# Patient Record
Sex: FEMALE | Race: WHITE | NOT HISPANIC OR LATINO | Employment: UNEMPLOYED | URBAN - METROPOLITAN AREA
[De-identification: names, ages, dates, MRNs, and addresses within clinical notes are randomized per-mention and may not be internally consistent; named-entity substitution may affect disease eponyms.]

---

## 2019-10-02 ENCOUNTER — OFFICE VISIT (OUTPATIENT)
Dept: CARDIOLOGY | Facility: CLINIC | Age: 59
End: 2019-10-02

## 2019-10-02 VITALS
DIASTOLIC BLOOD PRESSURE: 78 MMHG | HEART RATE: 84 BPM | HEIGHT: 64 IN | BODY MASS INDEX: 32.1 KG/M2 | WEIGHT: 188 LBS | SYSTOLIC BLOOD PRESSURE: 102 MMHG | RESPIRATION RATE: 16 BRPM | OXYGEN SATURATION: 96 %

## 2019-10-02 DIAGNOSIS — E78.2 MIXED HYPERLIPIDEMIA: ICD-10-CM

## 2019-10-02 DIAGNOSIS — I25.119 CORONARY ARTERY DISEASE WITH ANGINA PECTORIS, UNSPECIFIED VESSEL OR LESION TYPE, UNSPECIFIED WHETHER NATIVE OR TRANSPLANTED HEART (HCC): Primary | ICD-10-CM

## 2019-10-02 PROCEDURE — 99203 OFFICE O/P NEW LOW 30 MIN: CPT | Performed by: INTERNAL MEDICINE

## 2019-10-02 PROCEDURE — 93000 ELECTROCARDIOGRAM COMPLETE: CPT | Performed by: INTERNAL MEDICINE

## 2019-10-02 RX ORDER — ZOLPIDEM TARTRATE 10 MG/1
TABLET ORAL
Refills: 5 | COMMUNITY
Start: 2019-09-19

## 2019-10-02 RX ORDER — PANTOPRAZOLE SODIUM 40 MG/1
TABLET, DELAYED RELEASE ORAL
COMMUNITY
Start: 2017-03-06

## 2019-10-02 RX ORDER — CHLORCYCLIZINE HYDROCHLORIDE AND PSEUDOEPHEDRINE HYDROCHLORIDE 25; 60 MG/1; MG/1
TABLET ORAL
Refills: 1 | COMMUNITY
Start: 2019-09-10 | End: 2021-01-28 | Stop reason: HOSPADM

## 2019-10-02 RX ORDER — TOPIRAMATE 50 MG/1
TABLET, FILM COATED ORAL
Refills: 5 | COMMUNITY
Start: 2019-09-09

## 2019-10-02 RX ORDER — TRIAMTERENE AND HYDROCHLOROTHIAZIDE 75; 50 MG/1; MG/1
TABLET ORAL DAILY
Refills: 3 | COMMUNITY
Start: 2019-07-17

## 2019-10-02 RX ORDER — PHENTERMINE HYDROCHLORIDE 37.5 MG/1
TABLET ORAL
Refills: 1 | COMMUNITY
Start: 2019-09-19

## 2019-10-02 RX ORDER — ROSUVASTATIN CALCIUM 5 MG/1
10 TABLET, COATED ORAL DAILY
Refills: 4 | COMMUNITY
Start: 2019-09-12

## 2019-10-02 RX ORDER — ESCITALOPRAM OXALATE 20 MG/1
TABLET ORAL DAILY
Refills: 2 | COMMUNITY
Start: 2019-09-12

## 2019-10-02 RX ORDER — CETIRIZINE HYDROCHLORIDE 5 MG/1
5 TABLET ORAL DAILY
COMMUNITY

## 2019-10-02 RX ORDER — POLYETHYLENE GLYCOL 3350 17 G/17G
17 POWDER, FOR SOLUTION ORAL DAILY
COMMUNITY

## 2019-10-02 RX ORDER — BUPROPION HYDROCHLORIDE 150 MG/1
TABLET ORAL
COMMUNITY
Start: 2017-03-15

## 2019-10-02 RX ORDER — ASPIRIN 81 MG/1
81 TABLET ORAL DAILY
COMMUNITY

## 2019-10-02 RX ORDER — MULTIPLE VITAMINS W/ MINERALS TAB 9MG-400MCG
1 TAB ORAL DAILY
COMMUNITY

## 2019-10-02 RX ORDER — MELOXICAM 15 MG/1
TABLET ORAL
Refills: 3 | COMMUNITY
Start: 2019-07-18

## 2019-10-02 NOTE — PROGRESS NOTES
[facility]      Patient Name: Zahira Springer  :1960  Age: 59 y.o.  Encounter Provider:  Jorje Grigsby Jr, MD      Chief Complaint:   Chief Complaint   Patient presents with   • Coronary Artery Disease         HPI  Zahira Springer is a 59 y.o. female past medical history of hyperlipidemia, hypertension, osteoarthritis and degenerative disc disease presents for evaluation of an abnormal coronary artery calcium score.  Patient has a family history of brother with myocardial infarction without warning voiced her concerns to Dr. Gutierrez who appropriately ordered a coronary artery calcium scan.  Patient was found to have score of 126 almost all of which was noted in the region of the left main coronary artery.  The patient is fairly active without symptoms.  She specifically denies chest pain shortness of air palpitations.  No dizziness or syncope.  No orthopnea PND or edema.  She does curtail her activity somewhat if she completely avoids high impact activity due to the fact that she has had bilateral knee replacement surgeries.  She does a lot of work around the home and within the home.  She has several staircases which she is able to go up and down without any issues.  She denies tobacco alcohol or illicit drug use.  She is tolerating all her current medications well      The following portions of the patient's history were reviewed and updated as appropriate: allergies, current medications, past family history, past medical history, past social history, past surgical history and problem list.      Review of Systems   Constitution: Negative for chills and fever.   HENT: Negative for hoarse voice and sore throat.    Eyes: Negative for double vision and photophobia.   Cardiovascular: Positive for palpitations. Negative for chest pain, leg swelling, near-syncope, orthopnea, paroxysmal nocturnal dyspnea and syncope.   Respiratory: Negative for cough and wheezing.    Skin: Negative for poor wound  "healing and rash.   Musculoskeletal: Negative for arthritis and joint swelling.   Gastrointestinal: Negative for bloating, abdominal pain, hematemesis and hematochezia.   Neurological: Positive for numbness and paresthesias. Negative for dizziness and focal weakness.   Psychiatric/Behavioral: Negative for depression and suicidal ideas. The patient is nervous/anxious.    All other systems reviewed and are negative.      OBJECTIVE:   Vital Signs  Vitals:    10/02/19 1003   BP: 102/78   Pulse: 84   Resp: 16   SpO2: 96%     Estimated body mass index is 32.27 kg/m² as calculated from the following:    Height as of this encounter: 162.6 cm (64\").    Weight as of this encounter: 85.3 kg (188 lb).    Physical Exam   Constitutional: She is oriented to person, place, and time. She appears well-developed and well-nourished.   HENT:   Head: Normocephalic and atraumatic.   Eyes: Conjunctivae are normal. Pupils are equal, round, and reactive to light.   Neck: No JVD present. No thyromegaly present.   Cardiovascular: Exam reveals no gallop and no friction rub.   No murmur heard.  Pulmonary/Chest: No respiratory distress. She exhibits no tenderness.   Abdominal: Bowel sounds are normal. She exhibits no distension.   Musculoskeletal: She exhibits no edema or tenderness.   Neurological: She is alert and oriented to person, place, and time.   Skin: No rash noted. No erythema.   Psychiatric: She has a normal mood and affect. Judgment normal.   Vitals reviewed.        ECG 12 Lead  Date/Time: 10/2/2019 11:27 AM  Performed by: Jorje Grigsby Jr., MD  Authorized by: Jorje Grigsby Jr., MD   Comparison: not compared with previous ECG   Previous ECG: no previous ECG available  Rhythm: sinus rhythm  Conduction: incomplete right bundle branch block    Clinical impression: abnormal EKG                ASSESSMENT:      Diagnosis Plan   1. Coronary artery disease with angina pectoris, unspecified vessel or lesion type, unspecified whether native " or transplanted heart (CMS/[facility])           PLAN OF CARE:     1. Coronary artery disease -intermediate calcium score of 126 with strong family history and limited activity warrants screening study.  She thinks that she can walk for 5 to 6 minutes on the treadmill we will perform an exercise nuclear stress study.  Is already on ample dose of high intensity statin.  She was started on aspirin we will continue that daily.  We have discussed the need for continued healthy nutritional choices and increasing activity as tolerated.  2. Hypertension -seemingly well controlled.  Continue sodium and fluid restriction.  3. Hyperlipidemia -well controlled by last lipid panel.    Return to clinic 3 months           Discharge Medications           Accurate as of 10/2/19 10:32 AM. If you have any questions, ask your nurse or doctor.               Continue These Medications      Instructions Start Date   aspirin 81 MG EC tablet   81 mg, Oral, Daily      buPROPion  MG 24 hr tablet  Commonly known as:  WELLBUTRIN XL   No dose, route, or frequency recorded.      cetirizine 5 MG tablet  Commonly known as:  zyrTEC   5 mg, Oral, Daily      escitalopram 20 MG tablet  Commonly known as:  LEXAPRO   Oral, Daily      meloxicam 15 MG tablet  Commonly known as:  MOBIC   TK 1 T PO QD PRN P      multivitamin with minerals tablet tablet   1 tablet, Oral, Daily      pantoprazole 40 MG EC tablet  Commonly known as:  PROTONIX   TK 1 T PO QD      phentermine 37.5 MG tablet  Commonly known as:  ADIPEX-P   TK 1 T PO QAM      polyethylene glycol packet  Commonly known as:  MIRALAX   17 g, Oral, Daily      rosuvastatin 5 MG tablet  Commonly known as:  CRESTOR   Oral, Daily      STAHIST AD 25-60 MG tablet  Generic drug:  Chlorcyclizine-Pseudoephed   TAKE 1 T PO EVERY 8 HOURS PRN FOR CONGESTION      topiramate 50 MG tablet  Commonly known as:  TOPAMAX   TAKE  1 T PO BID      triamterene-hydrochlorothiazide 75-50 MG per tablet  Commonly known as:   MAXZIDE   Oral, Daily      zolpidem 10 MG tablet  Commonly known as:  AMBIEN   TK 1 T PO QD HS PRN FOR SLEEP             Thank you for allowing me to participate in the care of your patient,      Sincerely,   Milli Gomez MA  [facility]  10/02/19  10:32 AM

## 2019-10-16 ENCOUNTER — HOSPITAL ENCOUNTER (OUTPATIENT)
Dept: CARDIOLOGY | Facility: HOSPITAL | Age: 59
Discharge: HOME | End: 2019-10-16
Admitting: INTERNAL MEDICINE

## 2019-10-16 VITALS — HEIGHT: 64 IN | WEIGHT: 185 LBS | BODY MASS INDEX: 31.58 KG/M2

## 2019-10-16 DIAGNOSIS — I25.119 CORONARY ARTERY DISEASE WITH ANGINA PECTORIS, UNSPECIFIED VESSEL OR LESION TYPE, UNSPECIFIED WHETHER NATIVE OR TRANSPLANTED HEART (HCC): ICD-10-CM

## 2019-10-16 LAB
BH CV NUCLEAR PRIOR STUDY: 3
BH CV STRESS BP STAGE 1: NORMAL
BH CV STRESS BP STAGE 2: NORMAL
BH CV STRESS DURATION MIN STAGE 1: 3
BH CV STRESS DURATION MIN STAGE 2: 3
BH CV STRESS DURATION MIN STAGE 3: 1
BH CV STRESS DURATION SEC STAGE 1: 0
BH CV STRESS DURATION SEC STAGE 2: 0
BH CV STRESS DURATION SEC STAGE 3: 54
BH CV STRESS GRADE STAGE 1: 10
BH CV STRESS GRADE STAGE 2: 12
BH CV STRESS GRADE STAGE 3: 14
BH CV STRESS HR STAGE 1: 101
BH CV STRESS HR STAGE 2: 129
BH CV STRESS HR STAGE 3: 138
BH CV STRESS METS STAGE 1: 5
BH CV STRESS METS STAGE 2: 7.5
BH CV STRESS METS STAGE 3: 10
BH CV STRESS PROTOCOL 1: NORMAL
BH CV STRESS PROTOCOL 2 BP STAGE 1: NORMAL
BH CV STRESS PROTOCOL 2 COMMENTS STAGE 1: NORMAL
BH CV STRESS PROTOCOL 2 DOSE REGADENOSON STAGE 1: 0.4
BH CV STRESS PROTOCOL 2 DURATION MIN STAGE 1: 0
BH CV STRESS PROTOCOL 2 DURATION SEC STAGE 1: 10
BH CV STRESS PROTOCOL 2 HR STAGE 1: 106
BH CV STRESS PROTOCOL 2 STAGE 1: 1
BH CV STRESS PROTOCOL 2: NORMAL
BH CV STRESS RECOVERY BP: NORMAL MMHG
BH CV STRESS RECOVERY HR: 97 BPM
BH CV STRESS SPEED STAGE 1: 1.7
BH CV STRESS SPEED STAGE 2: 2.5
BH CV STRESS SPEED STAGE 3: 3.4
BH CV STRESS STAGE 1: 1
BH CV STRESS STAGE 2: 2
BH CV STRESS STAGE 3: 3
LV EF NUC BP: 66 %
MAXIMAL PREDICTED HEART RATE: 160 BPM
PERCENT MAX PREDICTED HR: 66.25 %
STRESS BASELINE BP: NORMAL MMHG
STRESS BASELINE HR: 67 BPM
STRESS PERCENT HR: 78 %
STRESS POST EXERCISE DUR SEC: 10 SEC
STRESS POST PEAK BP: NORMAL MMHG
STRESS POST PEAK HR: 106 BPM
STRESS TARGET HR: 136 BPM

## 2019-10-16 PROCEDURE — 93016 CV STRESS TEST SUPVJ ONLY: CPT | Performed by: INTERNAL MEDICINE

## 2019-10-16 PROCEDURE — 25010000002 REGADENOSON 0.4 MG/5ML SOLUTION: Performed by: INTERNAL MEDICINE

## 2019-10-16 PROCEDURE — 78452 HT MUSCLE IMAGE SPECT MULT: CPT | Performed by: INTERNAL MEDICINE

## 2019-10-16 PROCEDURE — A9502 TC99M TETROFOSMIN: HCPCS | Performed by: INTERNAL MEDICINE

## 2019-10-16 PROCEDURE — 93017 CV STRESS TEST TRACING ONLY: CPT

## 2019-10-16 PROCEDURE — 93018 CV STRESS TEST I&R ONLY: CPT | Performed by: INTERNAL MEDICINE

## 2019-10-16 PROCEDURE — 0 TECHNETIUM TETROFOSMIN KIT: Performed by: INTERNAL MEDICINE

## 2019-10-16 PROCEDURE — 78452 HT MUSCLE IMAGE SPECT MULT: CPT

## 2019-10-16 RX ADMIN — TETROFOSMIN 1 DOSE: 1.38 INJECTION, POWDER, LYOPHILIZED, FOR SOLUTION INTRAVENOUS at 09:50

## 2019-10-16 RX ADMIN — REGADENOSON 0.4 MG: 0.08 INJECTION, SOLUTION INTRAVENOUS at 09:50

## 2019-10-16 RX ADMIN — TETROFOSMIN 1 DOSE: 1.38 INJECTION, POWDER, LYOPHILIZED, FOR SOLUTION INTRAVENOUS at 08:45

## 2019-10-16 NOTE — PROGRESS NOTES
Please let the patient know that the stress test was negative for any low blood flow to the heart.  I will see the patient at the regularly scheduled follow-up appointment.

## 2020-01-13 ENCOUNTER — OFFICE VISIT (OUTPATIENT)
Dept: CARDIOLOGY | Facility: CLINIC | Age: 60
End: 2020-01-13

## 2020-01-13 VITALS
HEIGHT: 64 IN | BODY MASS INDEX: 32.78 KG/M2 | OXYGEN SATURATION: 94 % | DIASTOLIC BLOOD PRESSURE: 76 MMHG | SYSTOLIC BLOOD PRESSURE: 116 MMHG | RESPIRATION RATE: 16 BRPM | HEART RATE: 84 BPM | WEIGHT: 192 LBS

## 2020-01-13 DIAGNOSIS — I25.119 CORONARY ARTERY DISEASE WITH ANGINA PECTORIS, UNSPECIFIED VESSEL OR LESION TYPE, UNSPECIFIED WHETHER NATIVE OR TRANSPLANTED HEART (HCC): Primary | ICD-10-CM

## 2020-01-13 PROCEDURE — 99213 OFFICE O/P EST LOW 20 MIN: CPT | Performed by: INTERNAL MEDICINE

## 2020-01-13 NOTE — PROGRESS NOTES
[facility]      Patient Name: Zahira Springer  :1960  Age: 59 y.o.  Encounter Provider:  Jorje Grigsby Jr, MD      Chief Complaint:   Chief Complaint   Patient presents with   • Coronary Artery Disease         Coronary Artery Disease   Pertinent negatives include no chest pain, dizziness, leg swelling or palpitations.     Zahira Springer is a 59 y.o. female past medical history of hyperlipidemia, hypertension, osteoarthritis and degenerative disc disease presents for evaluation of an abnormal coronary artery calcium score.      Last visit: Patient has a family history of brother with myocardial infarction without warning voiced her concerns to Dr. Gutierrez who appropriately ordered a coronary artery calcium scan.  Patient was found to have score of 126 almost all of which was noted in the region of the left main coronary artery.  The patient is fairly active without symptoms.  She specifically denies chest pain shortness of air palpitations.  No dizziness or syncope.  No orthopnea PND or edema.  She does curtail her activity somewhat if she completely avoids high impact activity due to the fact that she has had bilateral knee replacement surgeries.  She does a lot of work around the home and within the home.  She has several staircases which she is able to go up and down without any issues.  She denies tobacco alcohol or illicit drug use.  She is tolerating all her current medications well    Nuclear stress study was performed showing no evidence of myocardial ischemia.  Left ventricular ejection fraction was calculated to be approximately 65%.  Patient remains asymptomatic and specifically denies chest pain, shortness of air or palpitations.  No orthopnea, PND or edema.  No dizziness or syncope.  Patient continues to increase activity as tolerated.  Treadmill time during stress study was 7 minutes consistent with fair to good functional capacity.    The following portions of the patient's  "history were reviewed and updated as appropriate: allergies, current medications, past family history, past medical history, past social history, past surgical history and problem list.      Review of Systems   Constitution: Negative for chills and fever.   HENT: Negative for hoarse voice and sore throat.    Eyes: Negative for double vision and photophobia.   Cardiovascular: Negative for chest pain, leg swelling, near-syncope, orthopnea, palpitations, paroxysmal nocturnal dyspnea and syncope.   Respiratory: Negative for cough and wheezing.    Skin: Negative for poor wound healing and rash.   Musculoskeletal: Negative for arthritis and joint swelling.   Gastrointestinal: Negative for bloating, abdominal pain, hematemesis and hematochezia.   Genitourinary: Positive for decreased libido.   Neurological: Negative for dizziness, focal weakness, numbness and paresthesias.   Psychiatric/Behavioral: Negative for depression and suicidal ideas. The patient is nervous/anxious.    All other systems reviewed and are negative.      OBJECTIVE:   Vital Signs  Vitals:    01/13/20 1444   Pulse: 84   Resp: 16   SpO2: 94%     Estimated body mass index is 32.96 kg/m² as calculated from the following:    Height as of this encounter: 162.6 cm (64\").    Weight as of this encounter: 87.1 kg (192 lb).    Physical Exam   Constitutional: She is oriented to person, place, and time. She appears well-developed and well-nourished.   HENT:   Head: Normocephalic and atraumatic.   Eyes: Pupils are equal, round, and reactive to light. Conjunctivae are normal.   Neck: No JVD present. No thyromegaly present.   Cardiovascular: Exam reveals no gallop and no friction rub.   No murmur heard.  Pulmonary/Chest: No respiratory distress. She exhibits no tenderness.   Abdominal: Bowel sounds are normal. She exhibits no distension.   Musculoskeletal: She exhibits no edema or tenderness.   Neurological: She is alert and oriented to person, place, and time.   Skin: " No rash noted. No erythema.   Psychiatric: She has a normal mood and affect. Judgment normal.   Vitals reviewed.      Procedures        ASSESSMENT:      Diagnosis Plan   1. Coronary artery disease with angina pectoris, unspecified vessel or lesion type, unspecified whether native or transplanted heart (CMS/[facility])           PLAN OF CARE:     1. Coronary artery disease -continue aspirin and statin.  Rosuvastatin was increased to 10 mg.  Repeat fasting lipid panel per primary care office.  2. Hypertension -seemingly well controlled.  Continue sodium and fluid restriction.  3. Hyperlipidemia -well controlled by last lipid panel.    Return to clinic 12 months           Discharge Medications           Accurate as of January 13, 2020  2:46 PM. If you have any questions, ask your nurse or doctor.               Continue These Medications      Instructions Start Date   aspirin 81 MG EC tablet   81 mg, Oral, Daily      buPROPion  MG 24 hr tablet  Commonly known as:  WELLBUTRIN XL   No dose, route, or frequency recorded.      cetirizine 5 MG tablet  Commonly known as:  zyrTEC   5 mg, Oral, Daily      escitalopram 20 MG tablet  Commonly known as:  LEXAPRO   Oral, Daily      meloxicam 15 MG tablet  Commonly known as:  MOBIC   TK 1 T PO QD PRN P      multivitamin with minerals tablet tablet   1 tablet, Oral, Daily      pantoprazole 40 MG EC tablet  Commonly known as:  PROTONIX   TK 1 T PO QD      phentermine 37.5 MG tablet  Commonly known as:  ADIPEX-P   TK 1 T PO QAM      polyethylene glycol packet  Commonly known as:  MIRALAX   17 g, Oral, Daily      rosuvastatin 5 MG tablet  Commonly known as:  CRESTOR   Oral, Daily      STAHIST AD 25-60 MG tablet  Generic drug:  Chlorcyclizine-Pseudoephed   TAKE 1 T PO EVERY 8 HOURS PRN FOR CONGESTION      topiramate 50 MG tablet  Commonly known as:  TOPAMAX   TAKE  1 T PO BID      triamterene-hydrochlorothiazide 75-50 MG per tablet  Commonly known as:  MAXZIDE   Oral, Daily      zolpidem  10 MG tablet  Commonly known as:  AMBIEN   TK 1 T PO QD HS PRN FOR SLEEP             Thank you for allowing me to participate in the care of your patient,      Sincerely,   Jorje Grigsby MD  [facility]  01/13/20  2:46 PM

## 2021-01-28 ENCOUNTER — OFFICE VISIT (OUTPATIENT)
Dept: CARDIOLOGY | Facility: CLINIC | Age: 61
End: 2021-01-28

## 2021-01-28 VITALS
DIASTOLIC BLOOD PRESSURE: 78 MMHG | HEART RATE: 73 BPM | BODY MASS INDEX: 33.29 KG/M2 | SYSTOLIC BLOOD PRESSURE: 130 MMHG | HEIGHT: 64 IN | WEIGHT: 195 LBS

## 2021-01-28 DIAGNOSIS — I25.119 CORONARY ARTERY DISEASE WITH ANGINA PECTORIS, UNSPECIFIED VESSEL OR LESION TYPE, UNSPECIFIED WHETHER NATIVE OR TRANSPLANTED HEART (HCC): Primary | ICD-10-CM

## 2021-01-28 PROCEDURE — 99213 OFFICE O/P EST LOW 20 MIN: CPT | Performed by: INTERNAL MEDICINE

## 2021-01-28 NOTE — PROGRESS NOTES
[facility]      Patient Name: Zahira Springer  :1960  Age: 60 y.o.  Encounter Provider:  Jorje Grigsby Jr, MD      Chief Complaint:   Chief Complaint   Patient presents with   • Follow-up         Coronary Artery Disease  Pertinent negatives include no chest pain, dizziness, leg swelling or palpitations.     Zahira Springer is a 60 y.o. female past medical history of hyperlipidemia, hypertension, osteoarthritis and degenerative disc disease presents for evaluation of an abnormal coronary artery calcium score.      Summary of clinic visitation: Patient has a family history of brother with myocardial infarction without warning voiced her concerns to Dr. Gutierrez who appropriately ordered a coronary artery calcium scan.  Patient was found to have score of 126 almost all of which was noted in the region of the left main coronary artery.  The patient is fairly active without symptoms.  She specifically denies chest pain shortness of air palpitations.  No dizziness or syncope.  No orthopnea PND or edema.  She does curtail her activity somewhat if she completely avoids high impact activity due to the fact that she has had bilateral knee replacement surgeries.  She does a lot of work around the home and within the home.  She has several staircases which she is able to go up and down without any issues.  She denies tobacco alcohol or illicit drug use.  She is tolerating all her current medications well Nuclear stress study was performed showing no evidence of myocardial ischemia.  Left ventricular ejection fraction was calculated to be approximately 65%.  Patient remains asymptomatic and specifically denies chest pain, shortness of air or palpitations.  No orthopnea, PND or edema.  No dizziness or syncope.  Patient continues to increase activity as tolerated.  Treadmill time during stress study was 7 minutes consistent with fair to good functional capacity.    In October she had severe abdominal pain was  "diagnosed with small bowel obstruction.  She had lysis of adhesions and partial excision of small bowel.  Since then she has never felt quite right.  She denies any chest pain, shortness of air or palpitations.  No dizziness or syncope.  No orthopnea, PND or edema.  She woke up a few weeks ago with severe joint pain which resolved after 48 hours.  She went to see Dr. Gutierrez and was noted to have a CRP of greater than 100.  Currently undergoing rheumatological evaluation.  Social family history reviewed and not pertinent to this clinic visit.    The following portions of the patient's history were reviewed and updated as appropriate: allergies, current medications, past family history, past medical history, past social history, past surgical history and problem list.      Review of Systems   Constitution: Negative for chills and fever.   HENT: Negative for hoarse voice and sore throat.    Eyes: Negative for double vision and photophobia.   Cardiovascular: Negative for chest pain, leg swelling, near-syncope, orthopnea, palpitations, paroxysmal nocturnal dyspnea and syncope.   Respiratory: Negative for cough and wheezing.    Skin: Negative for poor wound healing and rash.   Musculoskeletal: Positive for joint pain. Negative for arthritis and joint swelling.   Gastrointestinal: Negative for bloating, abdominal pain, hematemesis and hematochezia.   Genitourinary: Positive for decreased libido.   Neurological: Negative for dizziness, focal weakness, numbness and paresthesias.   Psychiatric/Behavioral: Negative for depression and suicidal ideas. The patient is nervous/anxious.    All other systems reviewed and are negative.      OBJECTIVE:   Vital Signs  Vitals:    01/28/21 1045   BP: 130/78   Pulse: 73     Estimated body mass index is 33.47 kg/m² as calculated from the following:    Height as of this encounter: 162.6 cm (64\").    Weight as of this encounter: 88.5 kg (195 lb).    Physical Exam   Constitutional: She is " oriented to person, place, and time. She appears well-developed and well-nourished.   HENT:   Head: Normocephalic and atraumatic.   Eyes: Pupils are equal, round, and reactive to light. Conjunctivae are normal.   Neck: No JVD present. No thyromegaly present.   Cardiovascular: Exam reveals no gallop and no friction rub.   No murmur heard.  Pulmonary/Chest: No respiratory distress. She exhibits no tenderness.   Abdominal: Bowel sounds are normal. She exhibits no distension.   Musculoskeletal:         General: No tenderness or edema.   Neurological: She is alert and oriented to person, place, and time.   Skin: No rash noted. No erythema.   Psychiatric: She has a normal mood and affect. Judgment normal.   Vitals reviewed.    Physical exam was reviewed, updated amended when necessary.    Procedures        ASSESSMENT:     Coronary artery disease  Hypertension  Dyslipidemia    PLAN OF CARE:     1. Coronary artery disease -continue aspirin and statin.  Patient is not having myalgias so do not think this is a reaction to statin.  Repeat fasting lipid panel per primary care office.  2. Hypertension -seemingly well controlled.  Continue sodium and fluid restriction.  3. Hyperlipidemia -well controlled by last lipid panel.    Return to clinic 12 months.  No specific cardiac complaints at this time.  We will monitor very closely.           Discharge Medications          Accurate as of January 28, 2021  5:08 PM. If you have any questions, ask your nurse or doctor.            Continue These Medications      Instructions Start Date   aspirin 81 MG EC tablet   81 mg, Oral, Daily      buPROPion  MG 24 hr tablet  Commonly known as: WELLBUTRIN XL   No dose, route, or frequency recorded.      cetirizine 5 MG tablet  Commonly known as: zyrTEC   5 mg, Oral, Daily      escitalopram 20 MG tablet  Commonly known as: LEXAPRO   Oral, Daily      meloxicam 15 MG tablet  Commonly known as: MOBIC   TK 1 T PO QD PRN P      multivitamin with  minerals tablet tablet   1 tablet, Oral, Daily      pantoprazole 40 MG EC tablet  Commonly known as: PROTONIX   TK 1 T PO QD      phentermine 37.5 MG tablet  Commonly known as: ADIPEX-P   TK 1 T PO QAM      polyethylene glycol packet  Commonly known as: MIRALAX   17 g, Oral, Daily      PROBIOTIC DAILY PO   Oral, Daily      rosuvastatin 5 MG tablet  Commonly known as: CRESTOR   10 mg, Oral, Daily      topiramate 50 MG tablet  Commonly known as: TOPAMAX   TAKE  1 T PO BID      triamterene-hydrochlorothiazide 75-50 MG per tablet  Commonly known as: MAXZIDE   Oral, Daily      VITAMIN C PO   1,000 mg, Oral, Daily      VITAMIN D (CHOLECALCIFEROL) PO   125 mg, Oral, Daily      Zinc 50 MG capsule   50 mg, Oral, Daily      zolpidem 10 MG tablet  Commonly known as: AMBIEN   TK 1 T PO QD HS PRN FOR SLEEP         Stop These Medications    Stahist AD 25-60 MG tablet  Generic drug: Chlorcyclizine-Pseudoephed  Stopped by: Jorje Grigsby Jr, MD            Thank you for allowing me to participate in the care of your patient,      Sincerely,   Jorje Grigsby MD  [facility]  01/28/21  17:08 EST

## 2021-02-15 ENCOUNTER — OFFICE (OUTPATIENT)
Dept: URBAN - METROPOLITAN AREA CLINIC 75 | Facility: CLINIC | Age: 61
End: 2021-02-15
Payer: COMMERCIAL

## 2021-02-15 VITALS — WEIGHT: 190 LBS | HEIGHT: 64 IN

## 2021-02-15 DIAGNOSIS — Z80.9 FAMILY HISTORY OF MALIGNANT NEOPLASM, UNSPECIFIED: ICD-10-CM

## 2021-02-15 DIAGNOSIS — R10.30 LOWER ABDOMINAL PAIN, UNSPECIFIED: ICD-10-CM

## 2021-02-15 DIAGNOSIS — R19.7 DIARRHEA, UNSPECIFIED: ICD-10-CM

## 2021-02-15 PROCEDURE — 99203 OFFICE O/P NEW LOW 30 MIN: CPT | Mod: 95 | Performed by: INTERNAL MEDICINE

## 2021-02-15 RX ORDER — DICYCLOMINE HYDROCHLORIDE 10 MG/1
30 CAPSULE ORAL
Qty: 120 | Refills: 3 | Status: ACTIVE
Start: 2021-02-15

## 2021-02-22 ENCOUNTER — IMMUNIZATION (OUTPATIENT)
Dept: VACCINE CLINIC | Facility: HOSPITAL | Age: 61
End: 2021-02-22

## 2021-02-22 PROCEDURE — 0001A: CPT | Performed by: INTERNAL MEDICINE

## 2021-02-22 PROCEDURE — 91300 HC SARSCOV02 VAC 30MCG/0.3ML IM: CPT | Performed by: INTERNAL MEDICINE

## 2021-03-15 ENCOUNTER — IMMUNIZATION (OUTPATIENT)
Dept: VACCINE CLINIC | Facility: HOSPITAL | Age: 61
End: 2021-03-15

## 2021-03-15 PROCEDURE — 91300 HC SARSCOV02 VAC 30MCG/0.3ML IM: CPT | Performed by: INTERNAL MEDICINE

## 2021-03-15 PROCEDURE — 0002A: CPT | Performed by: INTERNAL MEDICINE

## 2021-08-03 ENCOUNTER — TRANSCRIBE ORDERS (OUTPATIENT)
Dept: ADMINISTRATIVE | Facility: HOSPITAL | Age: 61
End: 2021-08-03

## 2021-08-03 DIAGNOSIS — Z13.6 ENCOUNTER FOR SCREENING FOR VASCULAR DISEASE: Primary | ICD-10-CM

## 2021-08-16 VITALS
DIASTOLIC BLOOD PRESSURE: 65 MMHG | OXYGEN SATURATION: 99 % | SYSTOLIC BLOOD PRESSURE: 145 MMHG | OXYGEN SATURATION: 97 % | SYSTOLIC BLOOD PRESSURE: 112 MMHG | RESPIRATION RATE: 18 BRPM | RESPIRATION RATE: 12 BRPM | DIASTOLIC BLOOD PRESSURE: 90 MMHG | OXYGEN SATURATION: 91 % | RESPIRATION RATE: 20 BRPM | SYSTOLIC BLOOD PRESSURE: 134 MMHG | SYSTOLIC BLOOD PRESSURE: 114 MMHG | DIASTOLIC BLOOD PRESSURE: 102 MMHG | DIASTOLIC BLOOD PRESSURE: 68 MMHG | DIASTOLIC BLOOD PRESSURE: 89 MMHG | OXYGEN SATURATION: 82 % | HEART RATE: 61 BPM | RESPIRATION RATE: 17 BRPM | HEART RATE: 60 BPM | DIASTOLIC BLOOD PRESSURE: 62 MMHG | SYSTOLIC BLOOD PRESSURE: 124 MMHG | SYSTOLIC BLOOD PRESSURE: 105 MMHG | TEMPERATURE: 97.6 F | HEIGHT: 64 IN | DIASTOLIC BLOOD PRESSURE: 80 MMHG | DIASTOLIC BLOOD PRESSURE: 91 MMHG | HEART RATE: 72 BPM | DIASTOLIC BLOOD PRESSURE: 79 MMHG | HEART RATE: 54 BPM | DIASTOLIC BLOOD PRESSURE: 87 MMHG | RESPIRATION RATE: 22 BRPM | RESPIRATION RATE: 14 BRPM | RESPIRATION RATE: 15 BRPM | SYSTOLIC BLOOD PRESSURE: 108 MMHG | WEIGHT: 205 LBS | OXYGEN SATURATION: 98 % | TEMPERATURE: 97.2 F | SYSTOLIC BLOOD PRESSURE: 101 MMHG | SYSTOLIC BLOOD PRESSURE: 139 MMHG | OXYGEN SATURATION: 100 % | HEART RATE: 57 BPM | SYSTOLIC BLOOD PRESSURE: 120 MMHG | SYSTOLIC BLOOD PRESSURE: 140 MMHG | RESPIRATION RATE: 23 BRPM | SYSTOLIC BLOOD PRESSURE: 97 MMHG | HEART RATE: 59 BPM

## 2021-08-17 ENCOUNTER — AMBULATORY SURGICAL CENTER (OUTPATIENT)
Dept: URBAN - METROPOLITAN AREA SURGERY 17 | Facility: SURGERY | Age: 61
End: 2021-08-17
Payer: COMMERCIAL

## 2021-08-17 ENCOUNTER — OFFICE (OUTPATIENT)
Dept: URBAN - METROPOLITAN AREA PATHOLOGY 4 | Facility: PATHOLOGY | Age: 61
End: 2021-08-17
Payer: COMMERCIAL

## 2021-08-17 DIAGNOSIS — D12.3 BENIGN NEOPLASM OF TRANSVERSE COLON: ICD-10-CM

## 2021-08-17 DIAGNOSIS — K64.8 OTHER HEMORRHOIDS: ICD-10-CM

## 2021-08-17 DIAGNOSIS — Z80.0 FAMILY HISTORY OF MALIGNANT NEOPLASM OF DIGESTIVE ORGANS: ICD-10-CM

## 2021-08-17 DIAGNOSIS — Z12.11 ENCOUNTER FOR SCREENING FOR MALIGNANT NEOPLASM OF COLON: ICD-10-CM

## 2021-08-17 DIAGNOSIS — K64.4 RESIDUAL HEMORRHOIDAL SKIN TAGS: ICD-10-CM

## 2021-08-17 PROBLEM — K63.5 POLYP OF COLON: Status: ACTIVE | Noted: 2021-08-17

## 2021-08-17 LAB
GI HISTOLOGY: A. UNSPECIFIED: (no result)
GI HISTOLOGY: PDF REPORT: (no result)

## 2021-08-17 PROCEDURE — 88305 TISSUE EXAM BY PATHOLOGIST: CPT | Mod: 33 | Performed by: INTERNAL MEDICINE

## 2021-08-17 PROCEDURE — 45385 COLONOSCOPY W/LESION REMOVAL: CPT | Mod: 33 | Performed by: INTERNAL MEDICINE

## 2021-08-17 NOTE — SERVICEHPINOTES
I last evaluated this patient in 2015 and she underwent an EGD and colonoscopy. The EGD was performed because of reflux symptoms and dysphagia. She was noted to have a small hiatal hernia, gastritis and esophagitis. She has done well on pantoprazole 40mg per day. Colonoscopy was done because of family history of colon cancer found in her father. The colonoscopy was very torturous and difficult, but it was normal. She also had a normal colonoscopy in 2004 and 2009, although they were torturous colons.The patient was worked into the office today because she had a bowel resection on 10/30/2020. She presented with abdominal pain, nausea and vomiting. CT scan was consistent with small bowel obstruction. She had surgical intervention on 10/30/2020 and was noted to have a distal jejunum portion which was obstructed with a dense adhesion. The bowel was gangrenous and 26cm was resected and pathology was consistent with ischemia. There was some ischemic portions on the margin of the resection, according to the pathology note.Since that bowel obstruction, the patient complains of fatigue and low energy. She also complains of abdominal tenderness and pain near the site of the incision. Her primary complaint is diarrhea. She is having 5-6 loose bowel movements per day with the majority occurring in the a.m. and after meals. 100% of her stools are of loose consistency. Prior to surgery, she had mild constipation and would take MiraLAX on a regular basis to relieve the constipation. She has had no melena or bright red blood per rectum. She has had some joint inflammation but she denies any fever or chills.CT scan revealed and essentially normal small bowel and colon. She did have evidence of a gallstone but no evidence of cholecystitis. She had laboratories obtained by Dr. Miller recently which are not available to me but she states that her CRP was elevated at 100.

## 2021-09-29 ENCOUNTER — IMMUNIZATION (OUTPATIENT)
Dept: VACCINE CLINIC | Facility: HOSPITAL | Age: 61
End: 2021-09-29

## 2021-09-29 PROCEDURE — 0003A: CPT | Performed by: INTERNAL MEDICINE

## 2021-09-29 PROCEDURE — 91300 HC SARSCOV02 VAC 30MCG/0.3ML IM: CPT | Performed by: INTERNAL MEDICINE

## 2022-01-18 ENCOUNTER — HOSPITAL ENCOUNTER (OUTPATIENT)
Dept: CARDIOLOGY | Facility: HOSPITAL | Age: 62
Discharge: HOME | End: 2022-01-18
Admitting: INTERNAL MEDICINE

## 2022-01-18 VITALS
HEIGHT: 63 IN | WEIGHT: 215 LBS | HEART RATE: 62 BPM | DIASTOLIC BLOOD PRESSURE: 80 MMHG | SYSTOLIC BLOOD PRESSURE: 126 MMHG | BODY MASS INDEX: 38.09 KG/M2

## 2022-01-18 DIAGNOSIS — Z13.6 ENCOUNTER FOR SCREENING FOR VASCULAR DISEASE: ICD-10-CM

## 2022-01-18 LAB
BH CV ECHO MEAS - DIST AO DIAM: 1.55 CM
BH CV VAS BP LEFT ARM: NORMAL MMHG
BH CV VAS BP RIGHT ARM: NORMAL MMHG
BH CV XLRA MEAS - MID AO DIAM: 1.72 CM
BH CV XLRA MEAS - PAD LEFT ABI DP: 1.03
BH CV XLRA MEAS - PAD LEFT ABI PT: 1.12
BH CV XLRA MEAS - PAD LEFT ARM: 123 MMHG
BH CV XLRA MEAS - PAD LEFT LEG DP: 130 MMHG
BH CV XLRA MEAS - PAD LEFT LEG PT: 141 MMHG
BH CV XLRA MEAS - PAD RIGHT ABI DP: 1.04
BH CV XLRA MEAS - PAD RIGHT ABI PT: 1.16
BH CV XLRA MEAS - PAD RIGHT ARM: 126 MMHG
BH CV XLRA MEAS - PAD RIGHT LEG DP: 131 MMHG
BH CV XLRA MEAS - PAD RIGHT LEG PT: 146 MMHG
BH CV XLRA MEAS - PROX AO DIAM: 1.95 CM
BH CV XLRA MEAS LEFT ICA/CCA RATIO: 0.98
BH CV XLRA MEAS LEFT MID CCA PSV: NORMAL CM/SEC
BH CV XLRA MEAS LEFT MID ICA PSV: NORMAL CM/SEC
BH CV XLRA MEAS LEFT PROX ECA PSV: NORMAL CM/SEC
BH CV XLRA MEAS RIGHT ICA/CCA RATIO: 0.92
BH CV XLRA MEAS RIGHT MID CCA PSV: NORMAL CM/SEC
BH CV XLRA MEAS RIGHT MID ICA PSV: NORMAL CM/SEC
BH CV XLRA MEAS RIGHT PROX ECA PSV: NORMAL CM/SEC

## 2022-01-18 PROCEDURE — 93799 UNLISTED CV SVC/PROCEDURE: CPT

## 2022-01-31 ENCOUNTER — OFFICE VISIT (OUTPATIENT)
Dept: CARDIOLOGY | Facility: CLINIC | Age: 62
End: 2022-01-31

## 2022-01-31 VITALS
HEART RATE: 61 BPM | BODY MASS INDEX: 37.28 KG/M2 | SYSTOLIC BLOOD PRESSURE: 124 MMHG | HEIGHT: 64 IN | DIASTOLIC BLOOD PRESSURE: 82 MMHG | WEIGHT: 218.4 LBS

## 2022-01-31 DIAGNOSIS — I25.119 CORONARY ARTERY DISEASE WITH ANGINA PECTORIS, UNSPECIFIED VESSEL OR LESION TYPE, UNSPECIFIED WHETHER NATIVE OR TRANSPLANTED HEART: Primary | ICD-10-CM

## 2022-01-31 PROCEDURE — 93000 ELECTROCARDIOGRAM COMPLETE: CPT | Performed by: INTERNAL MEDICINE

## 2022-01-31 PROCEDURE — 99213 OFFICE O/P EST LOW 20 MIN: CPT | Performed by: INTERNAL MEDICINE

## 2022-01-31 RX ORDER — DICYCLOMINE HYDROCHLORIDE 10 MG/1
1 CAPSULE ORAL AS NEEDED
COMMUNITY

## 2022-01-31 NOTE — PROGRESS NOTES
[facility]      Patient Name: Zahira Springer  :1960  Age: 61 y.o.  Encounter Provider:  Jorje Grigsby Jr, MD      Chief Complaint:   Chief Complaint   Patient presents with   • Coronary Artery Disease         Coronary Artery Disease  Pertinent negatives include no chest pain, dizziness, leg swelling or palpitations.     Zahira Springer is a 61 y.o. female past medical history of hyperlipidemia, hypertension, osteoarthritis and degenerative disc disease presents for evaluation of an abnormal coronary artery calcium score.      Summary of clinic visitation: Patient has a family history of brother with myocardial infarction without warning voiced her concerns to Dr. Gutierrez who appropriately ordered a coronary artery calcium scan.  Patient was found to have score of 126 almost all of which was noted in the region of the left main coronary artery.  The patient is fairly active without symptoms.  She specifically denies chest pain shortness of air palpitations.  No dizziness or syncope.  No orthopnea PND or edema.  She does curtail her activity somewhat if she completely avoids high impact activity due to the fact that she has had bilateral knee replacement surgeries.  She does a lot of work around the home and within the home.  She has several staircases which she is able to go up and down without any issues.  She denies tobacco alcohol or illicit drug use.  She is tolerating all her current medications well Nuclear stress study was performed showing no evidence of myocardial ischemia.  Left ventricular ejection fraction was calculated to be approximately 65%.  Patient remains asymptomatic and specifically denies chest pain, shortness of air or palpitations.  No orthopnea, PND or edema.  No dizziness or syncope.  Patient continues to increase activity as tolerated.  Treadmill time during stress study was 7 minutes consistent with fair to good functional capacity.    She is doing well since last  "visit.  Polyarthralgia being worked up by rheumatology.  She is currently on hydroxychloroquine.  No chest pain or shortness of air with activity.  No orthopnea, PND or edema.  No palpitations, dizziness or syncope.    The following portions of the patient's history were reviewed and updated as appropriate: allergies, current medications, past family history, past medical history, past social history, past surgical history and problem list.      Review of Systems   Constitutional: Negative for chills and fever.   HENT: Negative for hoarse voice and sore throat.    Eyes: Negative for double vision and photophobia.   Cardiovascular: Negative for chest pain, leg swelling, near-syncope, orthopnea, palpitations, paroxysmal nocturnal dyspnea and syncope.   Respiratory: Negative for cough and wheezing.    Skin: Negative for poor wound healing and rash.   Musculoskeletal: Positive for joint pain. Negative for arthritis and joint swelling.   Gastrointestinal: Negative for bloating, abdominal pain, hematemesis and hematochezia.   Genitourinary: Positive for decreased libido.   Neurological: Negative for dizziness, focal weakness, numbness and paresthesias.   Psychiatric/Behavioral: Negative for depression and suicidal ideas. The patient is nervous/anxious.    All other systems reviewed and are negative.    ROS was reviewed, updated and amended when necessary.    OBJECTIVE:   Vital Signs  Vitals:    01/31/22 1153   BP: 124/82   Pulse: 61     Estimated body mass index is 37.49 kg/m² as calculated from the following:    Height as of this encounter: 162.6 cm (64\").    Weight as of this encounter: 99.1 kg (218 lb 6.4 oz).    Physical Exam  Vitals reviewed.   Constitutional:       Appearance: She is well-developed.   HENT:      Head: Normocephalic and atraumatic.   Eyes:      Conjunctiva/sclera: Conjunctivae normal.      Pupils: Pupils are equal, round, and reactive to light.   Neck:      Thyroid: No thyromegaly.      Vascular: No " JVD.   Cardiovascular:      Heart sounds: No murmur heard.  No friction rub. No gallop.    Pulmonary:      Effort: No respiratory distress.   Chest:      Chest wall: No tenderness.   Abdominal:      General: Bowel sounds are normal. There is no distension.   Musculoskeletal:         General: No tenderness.   Skin:     Findings: No erythema or rash.   Neurological:      Mental Status: She is alert and oriented to person, place, and time.   Psychiatric:         Judgment: Judgment normal.       Physical exam was reviewed, updated and amended when necessary.      ECG 12 Lead    Date/Time: 1/31/2022 1:29 PM  Performed by: Jorje Grigsby Jr., MD  Authorized by: Jorje Grigsby Jr., MD   Comparison: compared with previous ECG from 1/28/2021  Similar to previous ECG  Rhythm: sinus rhythm  Conduction: right bundle branch block and left anterior fascicular block    Clinical impression: abnormal EKG                ASSESSMENT:     Coronary artery disease  Hypertension  Dyslipidemia    PLAN OF CARE:     1. Coronary artery disease -continue aspirin and statin.  Continue same.  No angina.  2. Hypertension -seemingly well controlled.  Continue sodium and fluid restriction.  3. Hyperlipidemia -well controlled by last lipid panel.    Return to clinic 12 months.  No specific cardiac complaints at this time.  We will monitor very closely.           Discharge Medications          Accurate as of January 31, 2022 11:56 AM. If you have any questions, ask your nurse or doctor.            Continue These Medications      Instructions Start Date   aspirin 81 MG EC tablet   81 mg, Oral, Daily      buPROPion  MG 24 hr tablet  Commonly known as: WELLBUTRIN XL   No dose, route, or frequency recorded.      cetirizine 5 MG tablet  Commonly known as: zyrTEC   5 mg, Oral, Daily      dicyclomine 10 MG capsule  Commonly known as: BENTYL   1 capsule, Oral, As Needed      escitalopram 20 MG tablet  Commonly known as: LEXAPRO   Oral, Daily       meloxicam 15 MG tablet  Commonly known as: MOBIC   TK 1 T PO QD PRN P      multivitamin with minerals tablet tablet   1 tablet, Oral, Daily      pantoprazole 40 MG EC tablet  Commonly known as: PROTONIX   TK 1 T PO QD      phentermine 37.5 MG tablet  Commonly known as: ADIPEX-P   TK 1 T PO QAM      polyethylene glycol packet  Commonly known as: MIRALAX   17 g, Oral, Daily      PROBIOTIC DAILY PO   Oral, Daily      rosuvastatin 5 MG tablet  Commonly known as: CRESTOR   10 mg, Oral, Daily      topiramate 50 MG tablet  Commonly known as: TOPAMAX   TAKE  1 T PO BID      triamterene-hydrochlorothiazide 75-50 MG per tablet  Commonly known as: MAXZIDE   Oral, Daily      VITAMIN C PO   1,000 mg, Oral, Daily      VITAMIN D (CHOLECALCIFEROL) PO   125 mg, Oral, Daily      Zinc 50 MG capsule   50 mg, Oral, Daily      zolpidem 10 MG tablet  Commonly known as: AMBIEN   TK 1 T PO QD HS PRN FOR SLEEP             Thank you for allowing me to participate in the care of your patient,      Sincerely,   Jorje Grigsby MD  [facility]  01/31/22  11:56 EST

## 2023-01-20 ENCOUNTER — OFFICE VISIT (OUTPATIENT)
Dept: CARDIOLOGY | Facility: CLINIC | Age: 63
End: 2023-01-20
Payer: COMMERCIAL

## 2023-01-20 VITALS
DIASTOLIC BLOOD PRESSURE: 88 MMHG | BODY MASS INDEX: 39.78 KG/M2 | HEIGHT: 64 IN | HEART RATE: 58 BPM | SYSTOLIC BLOOD PRESSURE: 122 MMHG | WEIGHT: 233 LBS

## 2023-01-20 DIAGNOSIS — I25.119 CORONARY ARTERY DISEASE INVOLVING NATIVE CORONARY ARTERY OF NATIVE HEART WITH ANGINA PECTORIS: Primary | ICD-10-CM

## 2023-01-20 PROCEDURE — 99214 OFFICE O/P EST MOD 30 MIN: CPT | Performed by: INTERNAL MEDICINE

## 2023-01-20 RX ORDER — HYDROXYCHLOROQUINE SULFATE 200 MG/1
1 TABLET, FILM COATED ORAL 2 TIMES DAILY
COMMUNITY
Start: 2022-08-01

## 2023-01-20 NOTE — PROGRESS NOTES
[facility]      Patient Name: Zahira Springer  :1960  Age: 62 y.o.  Encounter Provider:  Jorje Grigsby Jr, MD      Chief Complaint: Follow-up coronary artery disease      Coronary Artery Disease  Pertinent negatives include no chest pain, dizziness, leg swelling or palpitations.     Zahira Springer is a 62 y.o. female past medical history of hyperlipidemia, hypertension, osteoarthritis and degenerative disc disease presents for evaluation of an abnormal coronary artery calcium score.      Summary of clinic visitation: Patient has a family history of brother with myocardial infarction without warning voiced her concerns to Dr. Gutierrez who appropriately ordered a coronary artery calcium scan.  Patient was found to have score of 126 almost all of which was noted in the region of the left main coronary artery.  The patient is fairly active without symptoms.  She specifically denies chest pain shortness of air palpitations.  No dizziness or syncope.  No orthopnea PND or edema.  She does curtail her activity somewhat if she completely avoids high impact activity due to the fact that she has had bilateral knee replacement surgeries.  She does a lot of work around the home and within the home.  She has several staircases which she is able to go up and down without any issues.  She denies tobacco alcohol or illicit drug use.  She is tolerating all her current medications well Nuclear stress study was performed showing no evidence of myocardial ischemia.  Left ventricular ejection fraction was calculated to be approximately 65%.  Patient remains asymptomatic and specifically denies chest pain, shortness of air or palpitations.  No orthopnea, PND or edema.  No dizziness or syncope.  Patient continues to increase activity as tolerated.  Treadmill time during stress study was 7 minutes consistent with fair to good functional capacity.    She has been having some more chest pain recently.  She had a repeat  "scan with her wellness doctor that showed not much of a change with a total calcium score of 176 however this is all concentrated in the left main coronary artery.  Stress test in 2019 was negative.  She denies orthopnea, PND or edema.  No palpitations, dizziness or syncope.    The following portions of the patient's history were reviewed and updated as appropriate: allergies, current medications, past family history, past medical history, past social history, past surgical history and problem list.      Review of Systems   Constitutional: Negative for chills and fever.   HENT: Negative for hoarse voice and sore throat.    Eyes: Negative for double vision and photophobia.   Cardiovascular: Negative for chest pain, leg swelling, near-syncope, orthopnea, palpitations, paroxysmal nocturnal dyspnea and syncope.   Respiratory: Negative for cough and wheezing.    Skin: Negative for poor wound healing and rash.   Musculoskeletal: Positive for joint pain. Negative for arthritis and joint swelling.   Gastrointestinal: Negative for bloating, abdominal pain, hematemesis and hematochezia.   Genitourinary: Positive for decreased libido.   Neurological: Negative for dizziness, focal weakness, numbness and paresthesias.   Psychiatric/Behavioral: Negative for depression and suicidal ideas. The patient is nervous/anxious.    All other systems reviewed and are negative.    ROS was reviewed, updated and amended when necessary.    OBJECTIVE:   Vital Signs  Vitals:    01/20/23 1242   BP: 122/88   Pulse: 58     Estimated body mass index is 39.99 kg/m² as calculated from the following:    Height as of this encounter: 162.6 cm (64\").    Weight as of this encounter: 106 kg (233 lb).    Physical Exam  Vitals reviewed.   Constitutional:       Appearance: She is well-developed.   HENT:      Head: Normocephalic and atraumatic.   Eyes:      Conjunctiva/sclera: Conjunctivae normal.      Pupils: Pupils are equal, round, and reactive to light. "   Neck:      Thyroid: No thyromegaly.      Vascular: No JVD.   Cardiovascular:      Heart sounds: No murmur heard.    No friction rub. No gallop.   Pulmonary:      Effort: No respiratory distress.   Chest:      Chest wall: No tenderness.   Abdominal:      General: Bowel sounds are normal. There is no distension.   Musculoskeletal:         General: No tenderness.   Skin:     Findings: No erythema or rash.   Neurological:      Mental Status: She is alert and oriented to person, place, and time.   Psychiatric:         Judgment: Judgment normal.       Physical exam was reviewed, updated and amended when necessary.    Procedures        ASSESSMENT:     Coronary artery disease  Hypertension  Dyslipidemia    PLAN OF CARE:     1. Coronary artery disease -continue aspirin and statin.  Given recurrent chest pain we will plan for CT coronary angiogram.  Low resting heart rate precludes the use of beta-blocker for optimal cardiac gating.  2. Hypertension -seemingly well controlled.  Continue sodium and fluid restriction.  3. Hyperlipidemia -well controlled by last lipid panel.    Return to clinic 6-month           Discharge Medications          Accurate as of January 20, 2023 12:48 PM. If you have any questions, ask your nurse or doctor.            Continue These Medications      Instructions Start Date   aspirin 81 MG EC tablet   81 mg, Oral, Daily      buPROPion  MG 24 hr tablet  Commonly known as: WELLBUTRIN XL   No dose, route, or frequency recorded.      cetirizine 5 MG tablet  Commonly known as: zyrTEC   5 mg, Oral, Daily      cimetidine 200 MG tablet  Commonly known as: TAGAMET   200 mg, Oral, As Needed      dicyclomine 10 MG capsule  Commonly known as: BENTYL   1 capsule, Oral, As Needed      escitalopram 20 MG tablet  Commonly known as: LEXAPRO   Oral, Daily      hydroxychloroquine 200 MG tablet  Commonly known as: PLAQUENIL   1 tablet, Oral, 2 Times Daily      meloxicam 15 MG tablet  Commonly known as: MOBIC   TK  1 T PO QD PRN P      multivitamin with minerals tablet tablet   1 tablet, Oral, Daily      pantoprazole 40 MG EC tablet  Commonly known as: PROTONIX   TK 1 T PO QD      phentermine 37.5 MG tablet  Commonly known as: ADIPEX-P   TK 1 T PO QAM      polyethylene glycol packet  Commonly known as: MIRALAX   17 g, Oral, Daily      PROBIOTIC DAILY PO   Oral, Daily      rosuvastatin 5 MG tablet  Commonly known as: CRESTOR   10 mg, Oral, Daily      topiramate 50 MG tablet  Commonly known as: TOPAMAX   TAKE  1 T PO BID      triamterene-hydrochlorothiazide 75-50 MG per tablet  Commonly known as: MAXZIDE   Oral, Daily      VITAMIN C PO   1,000 mg, Oral, Daily      VITAMIN D (CHOLECALCIFEROL) PO   125 mg, Oral, Daily      Zinc 50 MG capsule   50 mg, Oral, Daily      zolpidem 10 MG tablet  Commonly known as: AMBIEN   TK 1 T PO QD HS PRN FOR SLEEP             Thank you for allowing me to participate in the care of your patient,      Sincerely,   Jorje Grigsby MD  [facility]  01/20/23  12:48 EST

## 2023-03-24 ENCOUNTER — HOSPITAL ENCOUNTER (OUTPATIENT)
Dept: CT IMAGING | Facility: HOSPITAL | Age: 63
End: 2023-03-24
Payer: COMMERCIAL

## 2023-05-19 NOTE — PROGRESS NOTES
05/30/23 0001   Pre-Procedure Phone Call   Procedure Time Verified Yes   Arrival Time 1300   Procedure Location Verified Yes   Medical History Reviewed No   NPO Status Reinforced Yes   Ride and Caregiver Arranged N/A   Patient Knows to Bring Current Medications No   Bring Outside Films Requested No

## 2023-05-30 ENCOUNTER — HOSPITAL ENCOUNTER (OUTPATIENT)
Dept: CT IMAGING | Facility: HOSPITAL | Age: 63
Discharge: HOME | End: 2023-05-30
Admitting: INTERNAL MEDICINE

## 2023-05-30 VITALS
TEMPERATURE: 98.2 F | DIASTOLIC BLOOD PRESSURE: 60 MMHG | BODY MASS INDEX: 37.56 KG/M2 | SYSTOLIC BLOOD PRESSURE: 104 MMHG | RESPIRATION RATE: 16 BRPM | HEIGHT: 64 IN | HEART RATE: 58 BPM | WEIGHT: 220 LBS | OXYGEN SATURATION: 92 %

## 2023-05-30 DIAGNOSIS — I25.119 CORONARY ARTERY DISEASE INVOLVING NATIVE CORONARY ARTERY OF NATIVE HEART WITH ANGINA PECTORIS: ICD-10-CM

## 2023-05-30 LAB
CREAT BLDA-MCNC: 1 MG/DL (ref 0.6–1.3)
QT INTERVAL: 477 MS

## 2023-05-30 PROCEDURE — 82565 ASSAY OF CREATININE: CPT

## 2023-05-30 PROCEDURE — 25510000001 IOPAMIDOL PER 1 ML: Performed by: INTERNAL MEDICINE

## 2023-05-30 PROCEDURE — 93005 ELECTROCARDIOGRAM TRACING: CPT | Performed by: INTERNAL MEDICINE

## 2023-05-30 PROCEDURE — 75574 CT ANGIO HRT W/3D IMAGE: CPT

## 2023-05-30 RX ORDER — NITROGLYCERIN 0.4 MG/1
0.8 TABLET SUBLINGUAL ONCE
Status: COMPLETED | OUTPATIENT
Start: 2023-05-30 | End: 2023-05-30

## 2023-05-30 RX ADMIN — NITROGLYCERIN 0.8 MG: 0.4 TABLET SUBLINGUAL at 14:13

## 2023-05-30 RX ADMIN — IOPAMIDOL 95 ML: 755 INJECTION, SOLUTION INTRAVENOUS at 14:17

## 2023-05-31 ENCOUNTER — TELEPHONE (OUTPATIENT)
Dept: CARDIOLOGY | Facility: CLINIC | Age: 63
End: 2023-05-31

## 2023-05-31 ENCOUNTER — DOCUMENTATION (OUTPATIENT)
Dept: CARDIOLOGY | Facility: CLINIC | Age: 63
End: 2023-05-31

## 2023-05-31 NOTE — PROGRESS NOTES
Cardiac CTA with morphology  Imaging done 5/30/23  Reason for the exam: chest pain    Calcium score is 179 Agatston units.  This is between the 75-90 percentile for women between the ages of 60-64 years old.    Heart rate 47 bpm.  Left ventricular end-diastolic volume 142 mL.  Left ventricular end-systolic volume 38 mL.  Ejection fraction 73%.  Stroke volume 104 mL.  Cardiac output 4.9 L/m    The right atrium is normal in size.  The right ventricle is normal in size.  There is grossly normal right ventricular systolic function.  The pulmonary artery is normal in size.  There are 4 pulmonary veins which enter the left atrium in their expected location.  The left atrial appendage was visualized and is without thrombus.  The intra-atrial septum appeared to be intact.  The left ventricle is normal in size with normal systolic function.  There was no evidence of a left ventricular thrombus.  The intraventricular septum appeared to be intact.  The mitral valve appeared structurally normal.  The aortic valve was trileaflet and appears structurally and functionally normal.  The pulmonic valve appeared structurally normal.  The tricuspid valve appeared structurally normal.  There was no pericardial effusion.  The pericardium appeared normal.    The left main coronary artery came off the left coronary cusp in its anticipated location.  It trifurcated into the left anterior descending artery (LAD), ramus intermedius and the circumflex coronary artery.  There was evidence of atherosclerotic disease of the left main coronary artery, <50% calcified distal left main stenosis.  Left anterior descending artery wraps around the apex of the heart.  There is evidence of atherosclerotic disease, less than 50% calcified proximal LAD stenosis.  The ramus intermedius is a large-caliber normal vessel.  The circumflex artery is a small, nondominant vessel with no evidence of atherosclerotic disease.  The right coronary artery is a large,  dominant vessel with no evidence of atherosclerotic disease.    Conclusions:  1.  Normal coronary artery anatomy with evidence of atherosclerotic disease, mild left main and LAD disease-see report.  Calcium score is 179 Agatston units.  2.  Structurally normal heart.      Ana Marin MD  05/31/23

## 2023-05-31 NOTE — TELEPHONE ENCOUNTER
Spoke to patient about CT coronary angiogram.  No flow-limiting stenosis of either the left main or proximal coronary artery calcifications.  Already on aspirin and statin.  No indication for further testing at this time.

## 2023-06-01 NOTE — PROGRESS NOTES
Please let patient know that the radiology interpretation of lung fields shows no significant pathology.  I have already discussed calcium score and coronary angiogram results.  Thank you.

## 2023-06-02 ENCOUNTER — TELEPHONE (OUTPATIENT)
Dept: CARDIOLOGY | Facility: CLINIC | Age: 63
End: 2023-06-02
Payer: COMMERCIAL

## 2023-06-02 NOTE — TELEPHONE ENCOUNTER
I spoke with Zahira Springer and updated pt on results/recommendations from provider.  Pt verbalized understanding and has no further questions at this time.    Thank you,    Cathy Fong, RN  Triage [facility]  06/02/23 16:21 EDT

## 2023-06-02 NOTE — TELEPHONE ENCOUNTER
Left voicemail for Zahira Racheal requesting callback.    Thank you,  Krystyna CAMEJO RN  Triage Nurse [facility]   11:43 EDT

## 2023-06-02 NOTE — TELEPHONE ENCOUNTER
----- Message from Jorje Grigsby Jr., MD sent at 6/1/2023 12:26 PM EDT -----  Please let patient know that the radiology interpretation of lung fields shows no significant pathology.  I have already discussed calcium score and coronary angiogram results.  Thank you.

## 2023-06-09 ENCOUNTER — TRANSCRIBE ORDERS (OUTPATIENT)
Dept: ADMINISTRATIVE | Facility: HOSPITAL | Age: 63
End: 2023-06-09
Payer: COMMERCIAL

## 2023-06-09 DIAGNOSIS — Z13.820 SCREENING FOR OSTEOPOROSIS: Primary | ICD-10-CM

## 2023-06-13 ENCOUNTER — TRANSCRIBE ORDERS (OUTPATIENT)
Dept: ADMINISTRATIVE | Facility: HOSPITAL | Age: 63
End: 2023-06-13
Payer: COMMERCIAL

## 2023-06-13 DIAGNOSIS — Z78.0 POST-MENOPAUSAL: Primary | ICD-10-CM

## 2023-09-06 ENCOUNTER — HOSPITAL ENCOUNTER (OUTPATIENT)
Dept: BONE DENSITY | Facility: HOSPITAL | Age: 63
Discharge: HOME | End: 2023-09-06
Admitting: INTERNAL MEDICINE
Payer: COMMERCIAL

## 2023-09-06 DIAGNOSIS — Z78.0 POST-MENOPAUSAL: ICD-10-CM

## 2023-09-06 PROCEDURE — 77080 DXA BONE DENSITY AXIAL: CPT

## 2023-09-08 ENCOUNTER — OFFICE VISIT (OUTPATIENT)
Dept: CARDIOLOGY | Facility: CLINIC | Age: 63
End: 2023-09-08
Payer: COMMERCIAL

## 2023-09-08 VITALS
BODY MASS INDEX: 39.95 KG/M2 | WEIGHT: 234 LBS | HEIGHT: 64 IN | DIASTOLIC BLOOD PRESSURE: 76 MMHG | SYSTOLIC BLOOD PRESSURE: 140 MMHG | OXYGEN SATURATION: 99 % | HEART RATE: 67 BPM | RESPIRATION RATE: 20 BRPM

## 2023-09-08 DIAGNOSIS — I25.119 CORONARY ARTERY DISEASE INVOLVING NATIVE CORONARY ARTERY OF NATIVE HEART WITH ANGINA PECTORIS: Primary | ICD-10-CM

## 2023-09-08 RX ORDER — LORAZEPAM 0.5 MG/1
0.5 TABLET ORAL EVERY 8 HOURS PRN
COMMUNITY

## 2023-09-08 RX ORDER — CYCLOBENZAPRINE HCL 5 MG
5 TABLET ORAL 3 TIMES DAILY PRN
COMMUNITY

## 2023-09-08 NOTE — PROGRESS NOTES
[facility]      Patient Name: Zahira Springer  :1960  Age: 63 y.o.  Encounter Provider:  Jorje Grigsby Jr, MD      Chief Complaint: Follow-up coronary artery disease      Coronary Artery Disease  Pertinent negatives include no chest pain, dizziness, leg swelling or palpitations.   Zahira Springer is a 63 y.o. female past medical history of hyperlipidemia, hypertension, osteoarthritis and degenerative disc disease presents for evaluation of an abnormal coronary artery calcium score.      Summary of clinic visitation: Patient has a family history of brother with myocardial infarction without warning voiced her concerns to Dr. Gutierrez who appropriately ordered a coronary artery calcium scan.  Patient was found to have score of 126 almost all of which was noted in the region of the left main coronary artery.  The patient is fairly active without symptoms.  She specifically denies chest pain shortness of air palpitations.  No dizziness or syncope.  No orthopnea PND or edema.  She does curtail her activity somewhat if she completely avoids high impact activity due to the fact that she has had bilateral knee replacement surgeries.  She does a lot of work around the home and within the home.  She has several staircases which she is able to go up and down without any issues.  She denies tobacco alcohol or illicit drug use.  She is tolerating all her current medications well Nuclear stress study was performed showing no evidence of myocardial ischemia.  Left ventricular ejection fraction was calculated to be approximately 65%.  Patient remains asymptomatic and specifically denies chest pain, shortness of air or palpitations.  No orthopnea, PND or edema.  No dizziness or syncope.  Patient continues to increase activity as tolerated.  Treadmill time during stress study was 7 minutes consistent with fair to good functional capacity.    CT coronary angiogram showed less than 50% stenosis in the distal left  "main and proximal LAD.  No further chest pain after up titration of medications for anxiety.  She is a fairly active woman with no limiting symptoms.  No orthopnea, PND or edema.  No palpitations, dizziness or syncope.  Tolerating aspirin and rosuvastatin well.  No recent lipid profile in the epic system.    The following portions of the patient's history were reviewed and updated as appropriate: allergies, current medications, past family history, past medical history, past social history, past surgical history and problem list.      Review of Systems   Constitutional: Negative for chills and fever.   HENT:  Negative for hoarse voice and sore throat.    Eyes:  Negative for double vision and photophobia.   Cardiovascular:  Negative for chest pain, leg swelling, near-syncope, orthopnea, palpitations, paroxysmal nocturnal dyspnea and syncope.   Respiratory:  Negative for cough and wheezing.    Skin:  Negative for poor wound healing and rash.   Musculoskeletal:  Positive for joint pain. Negative for arthritis and joint swelling.   Gastrointestinal:  Negative for bloating, abdominal pain, hematemesis and hematochezia.   Genitourinary:  Positive for decreased libido.   Neurological:  Negative for dizziness, focal weakness, numbness and paresthesias.   Psychiatric/Behavioral:  Negative for depression and suicidal ideas. The patient is nervous/anxious.    All other systems reviewed and are negative.  ROS was reviewed, updated and amended when necessary.    OBJECTIVE:   Vital Signs  Vitals:    09/08/23 1319   BP: 140/76   Pulse: 67   Resp: 20   SpO2: 99%     Estimated body mass index is 40.17 kg/m² as calculated from the following:    Height as of this encounter: 162.6 cm (64\").    Weight as of this encounter: 106 kg (234 lb).    Physical Exam  Vitals reviewed.   Constitutional:       Appearance: She is well-developed.   HENT:      Head: Normocephalic and atraumatic.   Eyes:      Conjunctiva/sclera: Conjunctivae normal.     "  Pupils: Pupils are equal, round, and reactive to light.   Neck:      Thyroid: No thyromegaly.      Vascular: No JVD.   Cardiovascular:      Heart sounds: No murmur heard.    No friction rub. No gallop.   Pulmonary:      Effort: No respiratory distress.   Chest:      Chest wall: No tenderness.   Abdominal:      General: Bowel sounds are normal. There is no distension.   Musculoskeletal:         General: No tenderness.   Skin:     Findings: No erythema or rash.   Neurological:      Mental Status: She is alert and oriented to person, place, and time.   Psychiatric:         Judgment: Judgment normal.     Physical exam was reviewed, updated and amended when necessary.    Procedures        ASSESSMENT:     Coronary artery disease  Hypertension  Dyslipidemia    PLAN OF CARE:     Coronary artery disease -continue aspirin and statin.  CT coronary angiogram classically overestimates the severity of stenosis and currently there is no indication for cardiac catheterization or consideration for revascularization.  We will have a low threshold for definitive level of testing if the patient develops new symptoms.  We will request latest lipid profile from PCP office.  Hypertension -seemingly well controlled.  Continue sodium and fluid restriction.  Hyperlipidemia -well controlled by last lipid panel.    Return to clinic 6-month           Discharge Medications            Accurate as of September 8, 2023  1:43 PM. If you have any questions, ask your nurse or doctor.                Continue These Medications        Instructions Start Date   aspirin 81 MG EC tablet   81 mg, Oral, Daily      cetirizine 5 MG tablet  Commonly known as: zyrTEC   5 mg, Oral, Daily      cimetidine 200 MG tablet  Commonly known as: TAGAMET   200 mg, Oral, As Needed      cyclobenzaprine 5 MG tablet  Commonly known as: FLEXERIL   5 mg, Oral, 3 Times Daily PRN, prn      escitalopram 20 MG tablet  Commonly known as: LEXAPRO   Oral, Daily      hydroxychloroquine  200 MG tablet  Commonly known as: PLAQUENIL   1 tablet, Oral, 2 Times Daily      LORazepam 0.5 MG tablet  Commonly known as: ATIVAN   0.5 mg, Oral, Every 8 Hours PRN      meloxicam 15 MG tablet  Commonly known as: MOBIC   TK 1 T PO QD PRN P      multivitamin with minerals tablet tablet   1 tablet, Oral, Daily      phentermine 37.5 MG tablet  Commonly known as: ADIPEX-P   TK 1 T PO QAM      polyethylene glycol packet  Commonly known as: MIRALAX   17 g, Oral, Daily      PROBIOTIC DAILY PO   Oral, Daily      rosuvastatin 5 MG tablet  Commonly known as: CRESTOR   10 mg, Oral, Daily      topiramate 50 MG tablet  Commonly known as: TOPAMAX   TAKE  1 T PO BID      zolpidem 10 MG tablet  Commonly known as: AMBIEN   TK 1 T PO QD HS PRN FOR SLEEP               Thank you for allowing me to participate in the care of your patient,      Sincerely,   Jorje Grigsby MD  [facility]  09/08/23  13:43 EDT

## 2024-04-12 ENCOUNTER — OFFICE VISIT (OUTPATIENT)
Dept: CARDIOLOGY | Facility: CLINIC | Age: 64
End: 2024-04-12
Payer: MEDICARE

## 2024-04-12 VITALS
DIASTOLIC BLOOD PRESSURE: 74 MMHG | HEIGHT: 64 IN | WEIGHT: 236 LBS | SYSTOLIC BLOOD PRESSURE: 126 MMHG | HEART RATE: 64 BPM | BODY MASS INDEX: 40.29 KG/M2 | RESPIRATION RATE: 16 BRPM | OXYGEN SATURATION: 98 %

## 2024-04-12 DIAGNOSIS — I20.0 UNSTABLE ANGINA: Primary | ICD-10-CM

## 2024-04-12 DIAGNOSIS — I25.119 CORONARY ARTERY DISEASE WITH ANGINA PECTORIS, UNSPECIFIED VESSEL OR LESION TYPE, UNSPECIFIED WHETHER NATIVE OR TRANSPLANTED HEART: ICD-10-CM

## 2024-04-12 PROCEDURE — 1160F RVW MEDS BY RX/DR IN RCRD: CPT | Performed by: INTERNAL MEDICINE

## 2024-04-12 PROCEDURE — 1159F MED LIST DOCD IN RCRD: CPT | Performed by: INTERNAL MEDICINE

## 2024-04-12 PROCEDURE — 99214 OFFICE O/P EST MOD 30 MIN: CPT | Performed by: INTERNAL MEDICINE

## 2024-04-12 NOTE — PROGRESS NOTES
[facility]      Patient Name: Zahira Springer  :1960  Age: 64 y.o.  Encounter Provider:  Jorje Grigsby Jr, MD      Chief Complaint: Follow-up coronary artery disease      Coronary Artery Disease  Pertinent negatives include no chest pain, dizziness, leg swelling or palpitations.     Zahira Springer is a 64 y.o. female past medical history of hyperlipidemia, hypertension, osteoarthritis and degenerative disc disease presents for evaluation of an abnormal coronary artery calcium score.      Summary of clinic visitation: Patient has a family history of brother with myocardial infarction without warning voiced her concerns to Dr. Gutierrez who appropriately ordered a coronary artery calcium scan.  Patient was found to have score of 126 almost all of which was noted in the region of the left main coronary artery.  The patient is fairly active without symptoms.  She specifically denies chest pain shortness of air palpitations.  No dizziness or syncope.  No orthopnea PND or edema.  She does curtail her activity somewhat if she completely avoids high impact activity due to the fact that she has had bilateral knee replacement surgeries.  She does a lot of work around the home and within the home.  She has several staircases which she is able to go up and down without any issues.  She denies tobacco alcohol or illicit drug use.  She is tolerating all her current medications well Nuclear stress study was performed showing no evidence of myocardial ischemia.  Left ventricular ejection fraction was calculated to be approximately 65%.  Patient remains asymptomatic and specifically denies chest pain, shortness of air or palpitations.  No orthopnea, PND or edema.  No dizziness or syncope.  Patient continues to increase activity as tolerated.  Treadmill time during stress study was 7 minutes consistent with fair to good functional capacity.    CT coronary angiogram showed less than 50% stenosis in the distal left  main and proximal LAD.  No further chest pain after up titration of medications for anxiety.  She is a fairly active woman with no limiting symptoms.  No orthopnea, PND or edema.  No palpitations, dizziness or syncope.  Tolerating aspirin and rosuvastatin well.  No recent lipid profile in the epic system.    Over the last 6 to 8 months she is noting increased shortness of breath with activity as well as exertional chest discomfort.  She is not experienced anything so severe that she felt the need to go to the emergency room but just a few weeks ago she was walking down the beach with a light weight aluminum chair and began to feel chest pressure and shortness of breath that caused her to stop and have to rest for several seconds before she could go on.  Blood pressure and heart rate are well-controlled today in clinic.  No recent lipid profile in the computer and records have been requested from Dr. Gutierrez's office.  She is on good medical therapy.    The following portions of the patient's history were reviewed and updated as appropriate: allergies, current medications, past family history, past medical history, past social history, past surgical history and problem list.      Review of Systems   Constitutional: Negative for chills and fever.   HENT:  Negative for hoarse voice and sore throat.    Eyes:  Negative for double vision and photophobia.   Cardiovascular:  Negative for chest pain, leg swelling, near-syncope, orthopnea, palpitations, paroxysmal nocturnal dyspnea and syncope.   Respiratory:  Negative for cough and wheezing.    Skin:  Negative for poor wound healing and rash.   Musculoskeletal:  Positive for joint pain. Negative for arthritis and joint swelling.   Gastrointestinal:  Negative for bloating, abdominal pain, hematemesis and hematochezia.   Genitourinary:  Positive for decreased libido.   Neurological:  Negative for dizziness, focal weakness, numbness and paresthesias.   Psychiatric/Behavioral:   "Negative for depression and suicidal ideas. The patient is nervous/anxious.    All other systems reviewed and are negative.    ROS was reviewed, updated and amended when necessary.    OBJECTIVE:   Vital Signs  Vitals:    04/12/24 1103   BP: 126/74   Pulse: 64   Resp: 16   SpO2: 98%     Estimated body mass index is 40.51 kg/m² as calculated from the following:    Height as of this encounter: 162.6 cm (64\").    Weight as of this encounter: 107 kg (236 lb).    Physical Exam  Vitals reviewed.   Constitutional:       Appearance: She is well-developed.   HENT:      Head: Normocephalic and atraumatic.   Eyes:      Conjunctiva/sclera: Conjunctivae normal.      Pupils: Pupils are equal, round, and reactive to light.   Neck:      Thyroid: No thyromegaly.      Vascular: No JVD.   Cardiovascular:      Heart sounds: No murmur heard.     No friction rub. No gallop.   Pulmonary:      Effort: No respiratory distress.   Chest:      Chest wall: No tenderness.   Abdominal:      General: Bowel sounds are normal. There is no distension.   Musculoskeletal:         General: No tenderness.   Skin:     Findings: No erythema or rash.   Neurological:      Mental Status: She is alert and oriented to person, place, and time.   Psychiatric:         Judgment: Judgment normal.       Physical exam was reviewed, updated and amended when necessary.    Procedures        ASSESSMENT:     Coronary artery disease  Hypertension  Dyslipidemia    PLAN OF CARE:     Coronary artery disease -continue aspirin and statin.  Given worsening symptoms we will plan for cardiac catheterization especially concerning location of atherosclerosis noted on CT coronary angiogram..  Hypertension -seemingly well controlled.  Continue sodium and fluid restriction.  Hyperlipidemia -well controlled by last lipid panel.    Return to clinic 6-month           Discharge Medications            Accurate as of April 12, 2024 11:11 AM. If you have any questions, ask your nurse or doctor. "                Continue These Medications        Instructions Start Date   aspirin 81 MG EC tablet   81 mg, Oral, Daily      cetirizine 5 MG tablet  Commonly known as: zyrTEC   5 mg, Oral, Daily      cimetidine 200 MG tablet  Commonly known as: TAGAMET   200 mg, Oral, As Needed      cyclobenzaprine 5 MG tablet  Commonly known as: FLEXERIL   5 mg, Oral, 3 Times Daily PRN, prn      escitalopram 20 MG tablet  Commonly known as: LEXAPRO   Oral, Daily      hydroxychloroquine 200 MG tablet  Commonly known as: PLAQUENIL   1 tablet, Oral, 2 Times Daily      LORazepam 0.5 MG tablet  Commonly known as: ATIVAN   0.5 mg, Oral, Every 8 Hours PRN      meloxicam 15 MG tablet  Commonly known as: MOBIC       multivitamin with minerals tablet tablet   1 tablet, Oral, Daily      phentermine 37.5 MG tablet  Commonly known as: ADIPEX-P   TK 1 T PO QAM      polyethylene glycol packet  Commonly known as: MIRALAX   17 g, Daily      PROBIOTIC DAILY PO   Oral, Daily      rosuvastatin 5 MG tablet  Commonly known as: CRESTOR   10 mg, Oral, Daily      topiramate 50 MG tablet  Commonly known as: TOPAMAX   TAKE  1 T PO BID      zolpidem 10 MG tablet  Commonly known as: AMBIEN   TK 1 T PO QD HS PRN FOR SLEEP               Thank you for allowing me to participate in the care of your patient,      Sincerely,   Jorje Grigsby MD  [facility]  04/12/24  11:11 EDT

## 2024-04-19 PROBLEM — I20.0 UNSTABLE ANGINA: Status: ACTIVE | Noted: 2024-04-12

## 2024-04-29 ENCOUNTER — TRANSCRIBE ORDERS (OUTPATIENT)
Dept: CARDIOLOGY | Facility: CLINIC | Age: 64
End: 2024-04-29
Payer: MEDICARE

## 2024-04-29 ENCOUNTER — LAB (OUTPATIENT)
Dept: LAB | Facility: HOSPITAL | Age: 64
End: 2024-04-29
Payer: MEDICARE

## 2024-04-29 DIAGNOSIS — Z13.6 SCREENING FOR ISCHEMIC HEART DISEASE: ICD-10-CM

## 2024-04-29 DIAGNOSIS — Z01.810 PRE-OPERATIVE CARDIOVASCULAR EXAMINATION: ICD-10-CM

## 2024-04-29 DIAGNOSIS — Z01.810 PRE-OPERATIVE CARDIOVASCULAR EXAMINATION: Primary | ICD-10-CM

## 2024-04-29 LAB
ANION GAP SERPL CALCULATED.3IONS-SCNC: 9.3 MMOL/L (ref 5–15)
BASOPHILS # BLD AUTO: 0.03 10*3/MM3 (ref 0–0.2)
BASOPHILS NFR BLD AUTO: 0.7 % (ref 0–1.5)
BUN SERPL-MCNC: 18 MG/DL (ref 8–23)
BUN/CREAT SERPL: 15.3 (ref 7–25)
CALCIUM SPEC-SCNC: 9.8 MG/DL (ref 8.6–10.5)
CHLORIDE SERPL-SCNC: 104 MMOL/L (ref 98–107)
CO2 SERPL-SCNC: 26.7 MMOL/L (ref 22–29)
CREAT SERPL-MCNC: 1.18 MG/DL (ref 0.57–1)
DEPRECATED RDW RBC AUTO: 44.1 FL (ref 37–54)
EGFRCR SERPLBLD CKD-EPI 2021: 51.4 ML/MIN/1.73
EOSINOPHIL # BLD AUTO: 0.18 10*3/MM3 (ref 0–0.4)
EOSINOPHIL NFR BLD AUTO: 4.4 % (ref 0.3–6.2)
ERYTHROCYTE [DISTWIDTH] IN BLOOD BY AUTOMATED COUNT: 12.8 % (ref 12.3–15.4)
GLUCOSE SERPL-MCNC: 132 MG/DL (ref 65–99)
HCT VFR BLD AUTO: 42.6 % (ref 34–46.6)
HGB BLD-MCNC: 13.7 G/DL (ref 12–15.9)
IMM GRANULOCYTES # BLD AUTO: 0 10*3/MM3 (ref 0–0.05)
IMM GRANULOCYTES NFR BLD AUTO: 0 % (ref 0–0.5)
LYMPHOCYTES # BLD AUTO: 0.91 10*3/MM3 (ref 0.7–3.1)
LYMPHOCYTES NFR BLD AUTO: 22.4 % (ref 19.6–45.3)
MCH RBC QN AUTO: 30 PG (ref 26.6–33)
MCHC RBC AUTO-ENTMCNC: 32.2 G/DL (ref 31.5–35.7)
MCV RBC AUTO: 93.4 FL (ref 79–97)
MONOCYTES # BLD AUTO: 0.24 10*3/MM3 (ref 0.1–0.9)
MONOCYTES NFR BLD AUTO: 5.9 % (ref 5–12)
NEUTROPHILS NFR BLD AUTO: 2.7 10*3/MM3 (ref 1.7–7)
NEUTROPHILS NFR BLD AUTO: 66.6 % (ref 42.7–76)
NRBC BLD AUTO-RTO: 0 /100 WBC (ref 0–0.2)
PLATELET # BLD AUTO: 190 10*3/MM3 (ref 140–450)
PMV BLD AUTO: 9.9 FL (ref 6–12)
POTASSIUM SERPL-SCNC: 5 MMOL/L (ref 3.5–5.2)
RBC # BLD AUTO: 4.56 10*6/MM3 (ref 3.77–5.28)
SODIUM SERPL-SCNC: 140 MMOL/L (ref 136–145)
WBC NRBC COR # BLD AUTO: 4.06 10*3/MM3 (ref 3.4–10.8)

## 2024-04-29 PROCEDURE — 80048 BASIC METABOLIC PNL TOTAL CA: CPT

## 2024-04-29 PROCEDURE — 85025 COMPLETE CBC W/AUTO DIFF WBC: CPT

## 2024-04-29 PROCEDURE — 36415 COLL VENOUS BLD VENIPUNCTURE: CPT

## 2024-05-07 ENCOUNTER — HOSPITAL ENCOUNTER (OUTPATIENT)
Facility: HOSPITAL | Age: 64
Setting detail: HOSPITAL OUTPATIENT SURGERY
Discharge: HOME | End: 2024-05-07
Attending: INTERNAL MEDICINE | Admitting: INTERNAL MEDICINE
Payer: MEDICARE

## 2024-05-07 VITALS
HEIGHT: 64 IN | BODY MASS INDEX: 39.27 KG/M2 | HEART RATE: 53 BPM | SYSTOLIC BLOOD PRESSURE: 147 MMHG | RESPIRATION RATE: 16 BRPM | OXYGEN SATURATION: 94 % | WEIGHT: 230 LBS | DIASTOLIC BLOOD PRESSURE: 85 MMHG | TEMPERATURE: 98 F

## 2024-05-07 DIAGNOSIS — I20.0 UNSTABLE ANGINA: ICD-10-CM

## 2024-05-07 PROCEDURE — 25510000001 IOPAMIDOL PER 1 ML: Performed by: INTERNAL MEDICINE

## 2024-05-07 PROCEDURE — 93458 L HRT ARTERY/VENTRICLE ANGIO: CPT | Performed by: INTERNAL MEDICINE

## 2024-05-07 PROCEDURE — 25810000003 SODIUM CHLORIDE 0.9 % SOLUTION: Performed by: INTERNAL MEDICINE

## 2024-05-07 PROCEDURE — 25010000002 HEPARIN (PORCINE) PER 1000 UNITS: Performed by: INTERNAL MEDICINE

## 2024-05-07 PROCEDURE — 25010000002 MIDAZOLAM PER 1 MG: Performed by: INTERNAL MEDICINE

## 2024-05-07 PROCEDURE — 25010000002 FENTANYL CITRATE (PF) 50 MCG/ML SOLUTION: Performed by: INTERNAL MEDICINE

## 2024-05-07 PROCEDURE — C1769 GUIDE WIRE: HCPCS | Performed by: INTERNAL MEDICINE

## 2024-05-07 PROCEDURE — C1894 INTRO/SHEATH, NON-LASER: HCPCS | Performed by: INTERNAL MEDICINE

## 2024-05-07 RX ORDER — SODIUM CHLORIDE 0.9 % (FLUSH) 0.9 %
10 SYRINGE (ML) INJECTION EVERY 12 HOURS SCHEDULED
Status: DISCONTINUED | OUTPATIENT
Start: 2024-05-07 | End: 2024-05-07 | Stop reason: HOSPADM

## 2024-05-07 RX ORDER — ACETAMINOPHEN 325 MG/1
650 TABLET ORAL EVERY 4 HOURS PRN
Status: DISCONTINUED | OUTPATIENT
Start: 2024-05-07 | End: 2024-05-07 | Stop reason: HOSPADM

## 2024-05-07 RX ORDER — MIDAZOLAM HYDROCHLORIDE 1 MG/ML
INJECTION INTRAMUSCULAR; INTRAVENOUS
Status: DISCONTINUED | OUTPATIENT
Start: 2024-05-07 | End: 2024-05-07 | Stop reason: HOSPADM

## 2024-05-07 RX ORDER — NITROGLYCERIN 0.4 MG/1
0.4 TABLET SUBLINGUAL
Status: DISCONTINUED | OUTPATIENT
Start: 2024-05-07 | End: 2024-05-07 | Stop reason: HOSPADM

## 2024-05-07 RX ORDER — VERAPAMIL HYDROCHLORIDE 2.5 MG/ML
INJECTION, SOLUTION INTRAVENOUS
Status: DISCONTINUED | OUTPATIENT
Start: 2024-05-07 | End: 2024-05-07 | Stop reason: HOSPADM

## 2024-05-07 RX ORDER — SODIUM CHLORIDE 9 MG/ML
75 INJECTION, SOLUTION INTRAVENOUS CONTINUOUS
Status: DISCONTINUED | OUTPATIENT
Start: 2024-05-07 | End: 2024-05-07 | Stop reason: HOSPADM

## 2024-05-07 RX ORDER — FENTANYL CITRATE 50 UG/ML
INJECTION, SOLUTION INTRAMUSCULAR; INTRAVENOUS
Status: DISCONTINUED | OUTPATIENT
Start: 2024-05-07 | End: 2024-05-07 | Stop reason: HOSPADM

## 2024-05-07 RX ORDER — LEVOCETIRIZINE DIHYDROCHLORIDE 5 MG/1
5 TABLET, FILM COATED ORAL EVERY EVENING
COMMUNITY

## 2024-05-07 RX ORDER — HEPARIN SODIUM 1000 [USP'U]/ML
INJECTION, SOLUTION INTRAVENOUS; SUBCUTANEOUS
Status: DISCONTINUED | OUTPATIENT
Start: 2024-05-07 | End: 2024-05-07 | Stop reason: HOSPADM

## 2024-05-07 RX ADMIN — SODIUM CHLORIDE 75 ML/HR: 9 INJECTION, SOLUTION INTRAVENOUS at 07:32

## 2024-10-11 ENCOUNTER — OFFICE VISIT (OUTPATIENT)
Dept: CARDIOLOGY | Facility: CLINIC | Age: 64
End: 2024-10-11
Payer: MEDICARE

## 2024-10-11 VITALS
SYSTOLIC BLOOD PRESSURE: 134 MMHG | DIASTOLIC BLOOD PRESSURE: 82 MMHG | RESPIRATION RATE: 18 BRPM | OXYGEN SATURATION: 98 % | BODY MASS INDEX: 39.95 KG/M2 | HEART RATE: 61 BPM | WEIGHT: 234 LBS | HEIGHT: 64 IN

## 2024-10-11 DIAGNOSIS — I25.10 CORONARY ARTERY DISEASE INVOLVING NATIVE CORONARY ARTERY OF NATIVE HEART WITHOUT ANGINA PECTORIS: Primary | ICD-10-CM

## 2024-10-11 PROCEDURE — 1159F MED LIST DOCD IN RCRD: CPT | Performed by: INTERNAL MEDICINE

## 2024-10-11 PROCEDURE — 93000 ELECTROCARDIOGRAM COMPLETE: CPT | Performed by: INTERNAL MEDICINE

## 2024-10-11 PROCEDURE — 1160F RVW MEDS BY RX/DR IN RCRD: CPT | Performed by: INTERNAL MEDICINE

## 2024-10-11 PROCEDURE — 99214 OFFICE O/P EST MOD 30 MIN: CPT | Performed by: INTERNAL MEDICINE

## 2024-10-11 NOTE — PROGRESS NOTES
[facility]      Patient Name: Zahira Springer  :1960  Age: 64 y.o.  Encounter Provider:  Jorje Grigsby Jr, MD      Chief Complaint: Follow-up coronary artery disease      Coronary Artery Disease  Pertinent negatives include no chest pain, dizziness, leg swelling or palpitations.     Zahira Springer is a 64 y.o. female past medical history of hyperlipidemia, hypertension, osteoarthritis and degenerative disc disease presents for evaluation of an abnormal coronary artery calcium score.      Summary of clinic visitation: Patient has a family history of brother with myocardial infarction without warning voiced her concerns to Dr. Gutierrez who appropriately ordered a coronary artery calcium scan.  Patient was found to have score of 126 almost all of which was noted in the region of the left main coronary artery.  The patient is fairly active without symptoms.  She specifically denies chest pain shortness of air palpitations.  No dizziness or syncope.  No orthopnea PND or edema.  She does curtail her activity somewhat if she completely avoids high impact activity due to the fact that she has had bilateral knee replacement surgeries.  She does a lot of work around the home and within the home.  She has several staircases which she is able to go up and down without any issues.  She denies tobacco alcohol or illicit drug use.  She is tolerating all her current medications well Nuclear stress study was performed showing no evidence of myocardial ischemia.  Left ventricular ejection fraction was calculated to be approximately 65%.  Patient remains asymptomatic and specifically denies chest pain, shortness of air or palpitations.  No orthopnea, PND or edema.  No dizziness or syncope.  Patient continues to increase activity as tolerated.  Treadmill time during stress study was 7 minutes consistent with fair to good functional capacity.    CT coronary angiogram showed less than 50% stenosis in the distal  left main and proximal LAD.  No further chest pain after up titration of medications for anxiety.  She is a fairly active woman with no limiting symptoms.  No orthopnea, PND or edema.  No palpitations, dizziness or syncope.  Tolerating aspirin and rosuvastatin well.  No recent lipid profile in the epic system.    Having some chest pain at last visit and given the elevated calcium score with calcium in the left main coronary artery a cardiac catheterization was performed showing no significant atherosclerotic disease.  Given her family history and calcium score we are continuing aspirin and statin.  Latest lipid profile requested from PCP office.  In June 2023 LDL was 74.  Blood pressure and heart rate are well-controlled.  She has had no further chest pain and exertional dyspnea is significantly improved.  No cardiac complaints at time of interview.    The following portions of the patient's history were reviewed and updated as appropriate: allergies, current medications, past family history, past medical history, past social history, past surgical history and problem list.      Review of Systems   Constitutional: Negative for chills and fever.   HENT:  Negative for hoarse voice and sore throat.    Eyes:  Negative for double vision and photophobia.   Cardiovascular:  Negative for chest pain, leg swelling, near-syncope, orthopnea, palpitations, paroxysmal nocturnal dyspnea and syncope.   Respiratory:  Negative for cough and wheezing.    Skin:  Negative for poor wound healing and rash.   Musculoskeletal:  Positive for joint pain. Negative for arthritis and joint swelling.   Gastrointestinal:  Negative for bloating, abdominal pain, hematemesis and hematochezia.   Genitourinary:  Positive for decreased libido.   Neurological:  Negative for dizziness, focal weakness, numbness and paresthesias.   Psychiatric/Behavioral:  Negative for depression and suicidal ideas. The patient is nervous/anxious.    All other systems  "reviewed and are negative.    ROS was reviewed, updated and amended when necessary.    OBJECTIVE:   Vital Signs  Vitals:    10/11/24 0917   BP: 134/82   Pulse: 61   Resp: 18   SpO2: 98%     Estimated body mass index is 40.17 kg/m² as calculated from the following:    Height as of this encounter: 162.6 cm (64\").    Weight as of this encounter: 106 kg (234 lb).    Physical Exam  Vitals reviewed.   Constitutional:       Appearance: She is well-developed.   HENT:      Head: Normocephalic and atraumatic.   Eyes:      Conjunctiva/sclera: Conjunctivae normal.      Pupils: Pupils are equal, round, and reactive to light.   Neck:      Thyroid: No thyromegaly.      Vascular: No JVD.   Cardiovascular:      Heart sounds: No murmur heard.     No friction rub. No gallop.   Pulmonary:      Effort: No respiratory distress.   Chest:      Chest wall: No tenderness.   Abdominal:      General: Bowel sounds are normal. There is no distension.   Musculoskeletal:         General: No tenderness.   Skin:     Findings: No erythema or rash.   Neurological:      Mental Status: She is alert and oriented to person, place, and time.   Psychiatric:         Judgment: Judgment normal.       Physical exam was reviewed, updated and amended when necessary.      ECG 12 Lead    Date/Time: 10/11/2024 9:17 AM  Performed by: Jorje Grigsby Jr., MD    Authorized by: Jorje Grigsby Jr., MD              ASSESSMENT:     Coronary artery disease  Hypertension  Dyslipidemia    PLAN OF CARE:     Coronary artery disease -continue aspirin and statin.  Lipid panel requested.  Hypertension -seemingly well controlled.  Continue sodium and fluid restriction.  Hyperlipidemia -well controlled by last lipid panel.  Last LDL 57.    Return to clinic 12 months           Discharge Medications            Accurate as of October 11, 2024  9:39 AM. If you have any questions, ask your nurse or doctor.                Continue These Medications        Instructions Start Date   aspirin " 81 MG EC tablet   81 mg, Oral, Daily      CALCIUM MAGNESIUM PO   Oral      cimetidine 200 MG tablet  Commonly known as: TAGAMET   200 mg, Oral, As Needed      escitalopram 20 MG tablet  Commonly known as: LEXAPRO   Oral, Daily      hydroxychloroquine 200 MG tablet  Commonly known as: PLAQUENIL   1 tablet, Oral, 2 Times Daily      levocetirizine 5 MG tablet  Commonly known as: XYZAL   5 mg, Oral, Every Evening      LORazepam 0.5 MG tablet  Commonly known as: ATIVAN   0.5 mg, Oral, Every 8 Hours PRN      meloxicam 15 MG tablet  Commonly known as: MOBIC       multivitamin with minerals tablet tablet   1 tablet, Oral, Daily      PROBIOTIC DAILY PO   Oral, Daily      rosuvastatin 5 MG tablet  Commonly known as: CRESTOR   10 mg, Oral, Daily      vitamin D3 125 MCG (5000 UT) capsule capsule   5,000 Units, Oral, Daily      zolpidem 10 MG tablet  Commonly known as: AMBIEN   TK 1 T PO QD HS PRN FOR SLEEP               Thank you for allowing me to participate in the care of your patient,      Sincerely,   Jorje Grigsby MD  [facility]  10/11/24  09:39 EDT

## (undated) DEVICE — KT MANIFLD CARDIAC

## (undated) DEVICE — GLIDESHEATH SLENDER STAINLESS STEEL KIT: Brand: GLIDESHEATH SLENDER

## (undated) DEVICE — CATH VENT MIV RADL PIG ST TIP 5F 110CM

## (undated) DEVICE — DGW .035 FC J3MM 260CM TEF: Brand: EMERALD

## (undated) DEVICE — TR BAND RADIAL ARTERY COMPRESSION DEVICE: Brand: TR BAND

## (undated) DEVICE — PK CATH CARD 40

## (undated) DEVICE — RADIFOCUS OPTITORQUE ANGIOGRAPHIC CATHETER: Brand: OPTITORQUE